# Patient Record
Sex: FEMALE | Race: WHITE | Employment: OTHER | URBAN - METROPOLITAN AREA
[De-identification: names, ages, dates, MRNs, and addresses within clinical notes are randomized per-mention and may not be internally consistent; named-entity substitution may affect disease eponyms.]

---

## 2023-08-31 ENCOUNTER — HOSPITAL ENCOUNTER (EMERGENCY)
Facility: HOSPITAL | Age: 78
Discharge: STILL ADMITTED OR ADMINISTRATIVE | End: 2023-09-01
Payer: MEDICARE

## 2023-08-31 ENCOUNTER — HOSPITAL ENCOUNTER (OUTPATIENT)
Facility: HOSPITAL | Age: 78
Setting detail: OBSERVATION
Discharge: HOME | End: 2023-09-01
Attending: EMERGENCY MEDICINE | Admitting: HOSPITALIST
Payer: MEDICARE

## 2023-08-31 ENCOUNTER — APPOINTMENT (OUTPATIENT)
Dept: GENERAL RADIOLOGY | Age: 78
End: 2023-08-31
Attending: EMERGENCY MEDICINE
Payer: MEDICARE

## 2023-08-31 ENCOUNTER — APPOINTMENT (OUTPATIENT)
Dept: NUCLEAR MEDICINE | Facility: HOSPITAL | Age: 78
End: 2023-08-31
Attending: EMERGENCY MEDICINE
Payer: MEDICARE

## 2023-08-31 VITALS
HEART RATE: 76 BPM | OXYGEN SATURATION: 94 % | BODY MASS INDEX: 28.35 KG/M2 | RESPIRATION RATE: 16 BRPM | WEIGHT: 160 LBS | DIASTOLIC BLOOD PRESSURE: 67 MMHG | SYSTOLIC BLOOD PRESSURE: 158 MMHG | TEMPERATURE: 97 F | HEIGHT: 63 IN

## 2023-08-31 DIAGNOSIS — R79.89 ELEVATED SERUM CREATININE: ICD-10-CM

## 2023-08-31 DIAGNOSIS — R77.8 ELEVATED TROPONIN: ICD-10-CM

## 2023-08-31 DIAGNOSIS — R07.9 ACUTE CHEST PAIN: Primary | ICD-10-CM

## 2023-08-31 LAB
ALBUMIN SERPL-MCNC: 3.5 G/DL (ref 3.4–5)
ALBUMIN/GLOB SERPL: 1 {RATIO} (ref 1–2)
ALP LIVER SERPL-CCNC: 92 U/L
ALT SERPL-CCNC: 24 U/L
ANION GAP SERPL CALC-SCNC: 5 MMOL/L (ref 0–18)
APTT PPP: 27.2 SECONDS (ref 23.3–35.6)
AST SERPL-CCNC: 20 U/L (ref 15–37)
BASOPHILS # BLD AUTO: 0.08 X10(3) UL (ref 0–0.2)
BASOPHILS NFR BLD AUTO: 0.7 %
BILIRUB SERPL-MCNC: 0.4 MG/DL (ref 0.1–2)
BUN BLD-MCNC: 22 MG/DL (ref 7–18)
CALCIUM BLD-MCNC: 8.9 MG/DL (ref 8.5–10.1)
CHLORIDE SERPL-SCNC: 109 MMOL/L (ref 98–112)
CHOLEST SERPL-MCNC: 154 MG/DL (ref ?–200)
CO2 SERPL-SCNC: 24 MMOL/L (ref 21–32)
CREAT BLD-MCNC: 2.15 MG/DL
D DIMER PPP FEU-MCNC: 1.77 UG/ML FEU (ref ?–0.76)
EGFRCR SERPLBLD CKD-EPI 2021: 23 ML/MIN/1.73M2 (ref 60–?)
EOSINOPHIL # BLD AUTO: 0.17 X10(3) UL (ref 0–0.7)
EOSINOPHIL NFR BLD AUTO: 1.6 %
ERYTHROCYTE [DISTWIDTH] IN BLOOD BY AUTOMATED COUNT: 13.9 %
GLOBULIN PLAS-MCNC: 3.5 G/DL (ref 2.8–4.4)
GLUCOSE BLD-MCNC: 124 MG/DL (ref 70–99)
HCT VFR BLD AUTO: 40.5 %
HDLC SERPL-MCNC: 45 MG/DL (ref 40–59)
HGB BLD-MCNC: 13 G/DL
IMM GRANULOCYTES # BLD AUTO: 0.13 X10(3) UL (ref 0–1)
IMM GRANULOCYTES NFR BLD: 1.2 %
LDLC SERPL CALC-MCNC: 82 MG/DL (ref ?–100)
LIPASE SERPL-CCNC: 83 U/L (ref 13–75)
LYMPHOCYTES # BLD AUTO: 1.55 X10(3) UL (ref 1–4)
LYMPHOCYTES NFR BLD AUTO: 14.4 %
MCH RBC QN AUTO: 28.3 PG (ref 26–34)
MCHC RBC AUTO-ENTMCNC: 32.1 G/DL (ref 31–37)
MCV RBC AUTO: 88.2 FL
MONOCYTES # BLD AUTO: 0.61 X10(3) UL (ref 0.1–1)
MONOCYTES NFR BLD AUTO: 5.7 %
NEUTROPHILS # BLD AUTO: 8.23 X10 (3) UL (ref 1.5–7.7)
NEUTROPHILS # BLD AUTO: 8.23 X10(3) UL (ref 1.5–7.7)
NEUTROPHILS NFR BLD AUTO: 76.4 %
NONHDLC SERPL-MCNC: 109 MG/DL (ref ?–130)
NT-PROBNP SERPL-MCNC: 463 PG/ML (ref ?–450)
OSMOLALITY SERPL CALC.SUM OF ELEC: 291 MOSM/KG (ref 275–295)
PLATELET # BLD AUTO: 451 10(3)UL (ref 150–450)
POTASSIUM SERPL-SCNC: 4 MMOL/L (ref 3.5–5.1)
PROT SERPL-MCNC: 7 G/DL (ref 6.4–8.2)
RBC # BLD AUTO: 4.59 X10(6)UL
SODIUM SERPL-SCNC: 138 MMOL/L (ref 136–145)
TRIGL SERPL-MCNC: 158 MG/DL (ref 30–149)
TROPONIN I HIGH SENSITIVITY: 66 NG/L
VLDLC SERPL CALC-MCNC: 25 MG/DL (ref 0–30)
WBC # BLD AUTO: 10.8 X10(3) UL (ref 4–11)

## 2023-08-31 PROCEDURE — 71045 X-RAY EXAM CHEST 1 VIEW: CPT | Performed by: EMERGENCY MEDICINE

## 2023-08-31 PROCEDURE — 78582 LUNG VENTILAT&PERFUS IMAGING: CPT | Performed by: EMERGENCY MEDICINE

## 2023-08-31 PROCEDURE — 99223 1ST HOSP IP/OBS HIGH 75: CPT | Performed by: HOSPITALIST

## 2023-08-31 PROCEDURE — 84484 ASSAY OF TROPONIN QUANT: CPT | Performed by: EMERGENCY MEDICINE

## 2023-08-31 RX ORDER — LISINOPRIL 10 MG/1
10 TABLET ORAL DAILY
COMMUNITY

## 2023-08-31 RX ORDER — ISOSORBIDE MONONITRATE 30 MG/1
30 TABLET, EXTENDED RELEASE ORAL DAILY
COMMUNITY

## 2023-08-31 RX ORDER — SODIUM CHLORIDE 9 MG/ML
INJECTION, SOLUTION INTRAVENOUS CONTINUOUS
Status: DISCONTINUED | OUTPATIENT
Start: 2023-08-31 | End: 2023-09-01

## 2023-08-31 RX ORDER — BISOPROLOL FUMARATE 10 MG/1
10 TABLET, FILM COATED ORAL DAILY
COMMUNITY

## 2023-08-31 RX ORDER — ISOSORBIDE MONONITRATE 30 MG/1
30 TABLET, EXTENDED RELEASE ORAL DAILY
Status: DISCONTINUED | OUTPATIENT
Start: 2023-09-01 | End: 2023-09-01

## 2023-08-31 RX ORDER — ONDANSETRON 2 MG/ML
8 INJECTION INTRAMUSCULAR; INTRAVENOUS EVERY 6 HOURS PRN
Status: DISCONTINUED | OUTPATIENT
Start: 2023-08-31 | End: 2023-09-01

## 2023-08-31 RX ORDER — LOVASTATIN 40 MG/1
40 TABLET ORAL NIGHTLY
COMMUNITY

## 2023-08-31 RX ORDER — BENZONATATE 100 MG/1
200 CAPSULE ORAL 3 TIMES DAILY PRN
Status: DISCONTINUED | OUTPATIENT
Start: 2023-08-31 | End: 2023-09-01

## 2023-08-31 RX ORDER — MELOXICAM 15 MG/1
15 TABLET ORAL DAILY
COMMUNITY

## 2023-08-31 RX ORDER — AMLODIPINE BESYLATE 5 MG/1
5 TABLET ORAL DAILY
Status: DISCONTINUED | OUTPATIENT
Start: 2023-08-31 | End: 2023-09-01

## 2023-08-31 RX ORDER — FLUTICASONE FUROATE, UMECLIDINIUM BROMIDE AND VILANTEROL TRIFENATATE 100; 62.5; 25 UG/1; UG/1; UG/1
1 POWDER RESPIRATORY (INHALATION) DAILY
COMMUNITY

## 2023-08-31 RX ORDER — ALBUTEROL SULFATE 90 UG/1
1 AEROSOL, METERED RESPIRATORY (INHALATION) EVERY 6 HOURS PRN
Status: DISCONTINUED | OUTPATIENT
Start: 2023-08-31 | End: 2023-09-01

## 2023-08-31 RX ORDER — BISACODYL 10 MG
10 SUPPOSITORY, RECTAL RECTAL
Status: DISCONTINUED | OUTPATIENT
Start: 2023-08-31 | End: 2023-09-01

## 2023-08-31 RX ORDER — HEPARIN SODIUM AND DEXTROSE 10000; 5 [USP'U]/100ML; G/100ML
12 INJECTION INTRAVENOUS ONCE
Status: COMPLETED | OUTPATIENT
Start: 2023-08-31 | End: 2023-09-01

## 2023-08-31 RX ORDER — ASPIRIN 81 MG/1
324 TABLET, CHEWABLE ORAL DAILY
Status: DISCONTINUED | OUTPATIENT
Start: 2023-09-01 | End: 2023-09-01

## 2023-08-31 RX ORDER — ACETAMINOPHEN 500 MG
500 TABLET ORAL EVERY 4 HOURS PRN
Status: DISCONTINUED | OUTPATIENT
Start: 2023-08-31 | End: 2023-09-01

## 2023-08-31 RX ORDER — BUDESONIDE 0.5 MG/2ML
0.5 INHALANT ORAL DAILY
COMMUNITY

## 2023-08-31 RX ORDER — METHOCARBAMOL 500 MG/1
500 TABLET, FILM COATED ORAL 4 TIMES DAILY
Status: DISCONTINUED | OUTPATIENT
Start: 2023-08-31 | End: 2023-09-01

## 2023-08-31 RX ORDER — ASPIRIN 81 MG/1
81 TABLET, CHEWABLE ORAL DAILY
COMMUNITY

## 2023-08-31 RX ORDER — BUDESONIDE 0.5 MG/2ML
0.5 INHALANT ORAL DAILY
Status: DISCONTINUED | OUTPATIENT
Start: 2023-08-31 | End: 2023-09-01

## 2023-08-31 RX ORDER — MELATONIN
3 NIGHTLY PRN
Status: DISCONTINUED | OUTPATIENT
Start: 2023-08-31 | End: 2023-09-01

## 2023-08-31 RX ORDER — POLYETHYLENE GLYCOL 3350 17 G/17G
17 POWDER, FOR SOLUTION ORAL DAILY PRN
Status: DISCONTINUED | OUTPATIENT
Start: 2023-08-31 | End: 2023-09-01

## 2023-08-31 RX ORDER — HEPARIN SODIUM AND DEXTROSE 10000; 5 [USP'U]/100ML; G/100ML
INJECTION INTRAVENOUS CONTINUOUS
Status: DISCONTINUED | OUTPATIENT
Start: 2023-09-01 | End: 2023-09-01

## 2023-08-31 RX ORDER — ATORVASTATIN CALCIUM 10 MG/1
10 TABLET, FILM COATED ORAL NIGHTLY
Status: DISCONTINUED | OUTPATIENT
Start: 2023-08-31 | End: 2023-09-01

## 2023-08-31 RX ORDER — ECHINACEA PURPUREA EXTRACT 125 MG
1 TABLET ORAL
Status: DISCONTINUED | OUTPATIENT
Start: 2023-08-31 | End: 2023-09-01

## 2023-08-31 RX ORDER — LISINOPRIL 10 MG/1
10 TABLET ORAL DAILY
Status: DISCONTINUED | OUTPATIENT
Start: 2023-09-01 | End: 2023-09-01

## 2023-08-31 RX ORDER — PANTOPRAZOLE SODIUM 20 MG/1
20 TABLET, DELAYED RELEASE ORAL
Status: DISCONTINUED | OUTPATIENT
Start: 2023-09-01 | End: 2023-09-01

## 2023-08-31 RX ORDER — ASPIRIN 81 MG/1
324 TABLET, CHEWABLE ORAL ONCE
Status: COMPLETED | OUTPATIENT
Start: 2023-08-31 | End: 2023-08-31

## 2023-08-31 RX ORDER — AMLODIPINE BESYLATE 5 MG/1
5 TABLET ORAL DAILY
COMMUNITY

## 2023-08-31 RX ORDER — METHOCARBAMOL 500 MG/1
500 TABLET, FILM COATED ORAL 4 TIMES DAILY
COMMUNITY

## 2023-08-31 RX ORDER — SENNOSIDES 8.6 MG
17.2 TABLET ORAL NIGHTLY PRN
Status: DISCONTINUED | OUTPATIENT
Start: 2023-08-31 | End: 2023-09-01

## 2023-08-31 RX ORDER — ALBUTEROL SULFATE 90 UG/1
AEROSOL, METERED RESPIRATORY (INHALATION) EVERY 6 HOURS PRN
COMMUNITY

## 2023-08-31 RX ORDER — OMEPRAZOLE 20 MG/1
20 CAPSULE, DELAYED RELEASE ORAL
COMMUNITY

## 2023-08-31 RX ORDER — HEPARIN SODIUM 1000 [USP'U]/ML
60 INJECTION, SOLUTION INTRAVENOUS; SUBCUTANEOUS ONCE
Status: COMPLETED | OUTPATIENT
Start: 2023-08-31 | End: 2023-08-31

## 2023-08-31 RX ORDER — METOPROLOL TARTRATE 100 MG/1
100 TABLET ORAL
Status: DISCONTINUED | OUTPATIENT
Start: 2023-09-01 | End: 2023-09-01

## 2023-08-31 NOTE — ED INITIAL ASSESSMENT (HPI)
Started with chest pain 1-1.5 hours ago while just sitting, felt warm, denies sweating, was short of breath, entire jaw felt tight, chest pain was at breast level, lasted one hour, denies pain now, did have nausea

## 2023-09-01 ENCOUNTER — APPOINTMENT (OUTPATIENT)
Dept: CV DIAGNOSTICS | Facility: HOSPITAL | Age: 78
End: 2023-09-01
Attending: HOSPITALIST
Payer: MEDICARE

## 2023-09-01 ENCOUNTER — APPOINTMENT (OUTPATIENT)
Dept: INTERVENTIONAL RADIOLOGY/VASCULAR | Facility: HOSPITAL | Age: 78
End: 2023-09-01
Attending: NURSE PRACTITIONER
Payer: MEDICARE

## 2023-09-01 VITALS
TEMPERATURE: 98 F | SYSTOLIC BLOOD PRESSURE: 135 MMHG | BODY MASS INDEX: 28.35 KG/M2 | WEIGHT: 160 LBS | HEIGHT: 63 IN | RESPIRATION RATE: 21 BRPM | DIASTOLIC BLOOD PRESSURE: 64 MMHG | OXYGEN SATURATION: 93 % | HEART RATE: 61 BPM

## 2023-09-01 LAB
ANION GAP SERPL CALC-SCNC: 3 MMOL/L (ref 0–18)
ANION GAP SERPL CALC-SCNC: 4 MMOL/L (ref 0–18)
APTT PPP: 176.5 SECONDS (ref 23.3–35.6)
APTT PPP: 31.9 SECONDS (ref 23.3–35.6)
ATRIAL RATE: 77 BPM
BUN BLD-MCNC: 21 MG/DL (ref 7–18)
BUN BLD-MCNC: 24 MG/DL (ref 7–18)
CALCIUM BLD-MCNC: 8.8 MG/DL (ref 8.5–10.1)
CALCIUM BLD-MCNC: 9.1 MG/DL (ref 8.5–10.1)
CHLORIDE SERPL-SCNC: 109 MMOL/L (ref 98–112)
CHLORIDE SERPL-SCNC: 113 MMOL/L (ref 98–112)
CO2 SERPL-SCNC: 25 MMOL/L (ref 21–32)
CO2 SERPL-SCNC: 27 MMOL/L (ref 21–32)
CREAT BLD-MCNC: 1.21 MG/DL
CREAT BLD-MCNC: 1.49 MG/DL
EGFRCR SERPLBLD CKD-EPI 2021: 36 ML/MIN/1.73M2 (ref 60–?)
EGFRCR SERPLBLD CKD-EPI 2021: 46 ML/MIN/1.73M2 (ref 60–?)
ERYTHROCYTE [DISTWIDTH] IN BLOOD BY AUTOMATED COUNT: 13.8 %
ERYTHROCYTE [DISTWIDTH] IN BLOOD BY AUTOMATED COUNT: 13.8 %
GLUCOSE BLD-MCNC: 102 MG/DL (ref 70–99)
GLUCOSE BLD-MCNC: 106 MG/DL (ref 70–99)
HCT VFR BLD AUTO: 36.9 %
HCT VFR BLD AUTO: 37.3 %
HGB BLD-MCNC: 12.3 G/DL
HGB BLD-MCNC: 12.4 G/DL
MCH RBC QN AUTO: 28.7 PG (ref 26–34)
MCH RBC QN AUTO: 28.8 PG (ref 26–34)
MCHC RBC AUTO-ENTMCNC: 33.2 G/DL (ref 31–37)
MCHC RBC AUTO-ENTMCNC: 33.3 G/DL (ref 31–37)
MCV RBC AUTO: 86.3 FL
MCV RBC AUTO: 86.4 FL
OSMOLALITY SERPL CALC.SUM OF ELEC: 294 MOSM/KG (ref 275–295)
OSMOLALITY SERPL CALC.SUM OF ELEC: 295 MOSM/KG (ref 275–295)
P AXIS: 71 DEGREES
P-R INTERVAL: 130 MS
PLATELET # BLD AUTO: 380 10(3)UL (ref 150–450)
PLATELET # BLD AUTO: 380 10(3)UL (ref 150–450)
PLATELET # BLD AUTO: 386 10(3)UL (ref 150–450)
POTASSIUM SERPL-SCNC: 3.8 MMOL/L (ref 3.5–5.1)
POTASSIUM SERPL-SCNC: 4.6 MMOL/L (ref 3.5–5.1)
Q-T INTERVAL: 400 MS
QRS DURATION: 78 MS
QTC CALCULATION (BEZET): 452 MS
R AXIS: 30 DEGREES
RBC # BLD AUTO: 4.27 X10(6)UL
RBC # BLD AUTO: 4.32 X10(6)UL
SODIUM SERPL-SCNC: 140 MMOL/L (ref 136–145)
SODIUM SERPL-SCNC: 141 MMOL/L (ref 136–145)
T AXIS: 41 DEGREES
TROPONIN I HIGH SENSITIVITY: 602 NG/L
TROPONIN I HIGH SENSITIVITY: 886 NG/L
VENTRICULAR RATE: 77 BPM
WBC # BLD AUTO: 10.4 X10(3) UL (ref 4–11)
WBC # BLD AUTO: 7.5 X10(3) UL (ref 4–11)

## 2023-09-01 PROCEDURE — 93306 TTE W/DOPPLER COMPLETE: CPT | Performed by: HOSPITALIST

## 2023-09-01 PROCEDURE — B2111ZZ FLUOROSCOPY OF MULTIPLE CORONARY ARTERIES USING LOW OSMOLAR CONTRAST: ICD-10-PCS | Performed by: INTERNAL MEDICINE

## 2023-09-01 PROCEDURE — B2151ZZ FLUOROSCOPY OF LEFT HEART USING LOW OSMOLAR CONTRAST: ICD-10-PCS | Performed by: INTERNAL MEDICINE

## 2023-09-01 PROCEDURE — 99239 HOSP IP/OBS DSCHRG MGMT >30: CPT | Performed by: STUDENT IN AN ORGANIZED HEALTH CARE EDUCATION/TRAINING PROGRAM

## 2023-09-01 PROCEDURE — 4A023N7 MEASUREMENT OF CARDIAC SAMPLING AND PRESSURE, LEFT HEART, PERCUTANEOUS APPROACH: ICD-10-PCS | Performed by: INTERNAL MEDICINE

## 2023-09-01 RX ORDER — ACETAMINOPHEN 325 MG/1
650 TABLET ORAL EVERY 4 HOURS PRN
Status: DISCONTINUED | OUTPATIENT
Start: 2023-09-01 | End: 2023-09-01

## 2023-09-01 RX ORDER — IODIXANOL 320 MG/ML
100 INJECTION, SOLUTION INTRAVASCULAR
Status: COMPLETED | OUTPATIENT
Start: 2023-09-01 | End: 2023-09-01

## 2023-09-01 RX ORDER — SODIUM CHLORIDE 9 MG/ML
INJECTION, SOLUTION INTRAVENOUS
Status: DISCONTINUED | OUTPATIENT
Start: 2023-09-02 | End: 2023-09-01 | Stop reason: HOSPADM

## 2023-09-01 RX ORDER — HEPARIN SODIUM 5000 [USP'U]/ML
INJECTION, SOLUTION INTRAVENOUS; SUBCUTANEOUS
Status: COMPLETED
Start: 2023-09-01 | End: 2023-09-01

## 2023-09-01 RX ORDER — TRAMADOL HYDROCHLORIDE 50 MG/1
100 TABLET ORAL EVERY 6 HOURS PRN
Status: DISCONTINUED | OUTPATIENT
Start: 2023-09-01 | End: 2023-09-01

## 2023-09-01 RX ORDER — TRAMADOL HYDROCHLORIDE 50 MG/1
50 TABLET ORAL EVERY 6 HOURS PRN
Status: DISCONTINUED | OUTPATIENT
Start: 2023-09-01 | End: 2023-09-01

## 2023-09-01 RX ORDER — SODIUM CHLORIDE 9 MG/ML
INJECTION, SOLUTION INTRAVENOUS CONTINUOUS
Status: DISCONTINUED | OUTPATIENT
Start: 2023-09-01 | End: 2023-09-01

## 2023-09-01 RX ORDER — ASPIRIN 81 MG/1
81 TABLET, CHEWABLE ORAL ONCE
Status: COMPLETED | OUTPATIENT
Start: 2023-09-01 | End: 2023-09-01

## 2023-09-01 RX ORDER — MIDAZOLAM HYDROCHLORIDE 1 MG/ML
INJECTION INTRAMUSCULAR; INTRAVENOUS
Status: COMPLETED
Start: 2023-09-01 | End: 2023-09-01

## 2023-09-01 RX ORDER — LIDOCAINE HYDROCHLORIDE 10 MG/ML
INJECTION, SOLUTION EPIDURAL; INFILTRATION; INTRACAUDAL; PERINEURAL
Status: COMPLETED
Start: 2023-09-01 | End: 2023-09-01

## 2023-09-01 RX ORDER — POTASSIUM CHLORIDE 20 MEQ/1
40 TABLET, EXTENDED RELEASE ORAL ONCE
Status: COMPLETED | OUTPATIENT
Start: 2023-09-01 | End: 2023-09-01

## 2023-09-01 NOTE — PLAN OF CARE
Receive patient from ED for CP. Pt denies CP at the moment. Patient is alert & oriented x4. Pt ambulates well, SBA for now overnight. Breath sounds are clear bilateral. On room air, CPAP worn at night. Normal sinus rhythm. Head-to-toe skin assessment done, no signs of wounds, skin dry and intact. Pt is continent of bowel and bladder. Heparin gtt and IVF overnight. Bed in low position and call light within reach. Reviewed plan of care and patient verbalizes understanding. Troponin elevated. Cardiology paged. Repeat troponin in AM. NPO after midnight. Problem: Patient/Family Goals  Goal: Patient/Family Long Term Goal  Description: Patient's Long Term Goal: Back home    Interventions:  - Hep gtt  - Echo  - See additional Care Plan goals for specific interventions  Outcome: Progressing  Goal: Patient/Family Short Term Goal  Description: Patient's Short Term Goal: No CP    Interventions:   - Medication management  - Hep gtt  - See additional Care Plan goals for specific interventions  Outcome: Progressing     Problem: CARDIOVASCULAR - ADULT  Goal: Maintains optimal cardiac output and hemodynamic stability  Description: INTERVENTIONS:  - Monitor vital signs, rhythm, and trends  - Monitor for bleeding, hypotension and signs of decreased cardiac output  - Evaluate effectiveness of vasoactive medications to optimize hemodynamic stability  - Monitor arterial and/or venous puncture sites for bleeding and/or hematoma  - Assess quality of pulses, skin color and temperature  - Assess for signs of decreased coronary artery perfusion - ex.  Angina  - Evaluate fluid balance, assess for edema, trend weights  Outcome: Progressing  Goal: Absence of cardiac arrhythmias or at baseline  Description: INTERVENTIONS:  - Continuous cardiac monitoring, monitor vital signs, obtain 12 lead EKG if indicated  - Evaluate effectiveness of antiarrhythmic and heart rate control medications as ordered  - Initiate emergency measures for life threatening arrhythmias  - Monitor electrolytes and administer replacement therapy as ordered  Outcome: Progressing

## 2023-09-01 NOTE — ED QUICK NOTES
Called ED charge to report that PT is en route.
Orders for admission, patient is aware of plan and ready to go upstairs. Any questions, please call ED RN Amanda at extension [phone]    Patient Covid vaccination status: Unvaccinated     COVID Test Ordered in ED: None    COVID Suspicion at Admission: N/A    Running Infusions:  Heparin    Mental Status/LOC at time of transport: A+O x4, from home, ambulatory.     Other pertinent information:   CIWA score: N/A   NIH score:  N/A
Patient out of department for Nuclear Med study.
Pt arrived to [facility] via [facility] w/o incident. Ambulatory to bathroom on arrival. Placed on continuous monitoring devices Heparin is running as ordered. NSR on monitor. Denies difficutly in breathing and denies chest pain/pressure at this time. Hospitalist at bedside.
Pt to go directly to [facility] ED. [facility] called for transport.   Report called to charge nurse Miguel Simons
Report to [facility]
[Negative] : Endocrine

## 2023-09-01 NOTE — PROCEDURES
Full procedure was dictated. Procedure performed:  -Selective coronary angiography  -LV gram    Indications:  -Chest pain and dyspnea on exertion  -Elevated troponin level 888  -CAD  -History of PCI of circumflex Taxus 2021  -COPD with chronic respiratory insufficiency on oxygen  -Hypertension  -Dyslipidemia    [facility] findings:  -Widely patent circumflex/OM long stent  -No disease in remaining coronary arteries and left coronary arterial circulation  -Diffuse mild disease in RCA    LV gram: LVEF 65% with no wall motion abnormalities  1+/2+ MR  No thoracic aorta aneurysm  No pressure gradient across aortic valve    Hemostasis of the right femoral artery: Manual hold due to very high right femoral bifurcation. No closure device was used. Plan:  -Renal function improved but will continue hydration for another 8 hours  -Discharge plan late afternoon early evening if all good and stable and no right groin issues after catheterization otherwise tomorrow morning -patient has to ambulate with no symptoms  -No need for PCI, medical management  -DC IV heparin drip  -Most likely patient presentation is related to COPD with chronic hypoxic respiratory insufficiency on constant oxygen. She is traveling and over exert herself by sightseeing with family in [facility] -troponin elevation due to mismatch with hypoxia  -Continue continuous oxygen supply and she has it with her traveling from [place] and will plan to go to [place] but should not fly sooner than in 5 days being after cardiac catheterization  -Follow-up with patient's cardiologist in [place] after finishing seeing family for holidays  -Continue aspirin and statin with other cardiac medications as scheduled. No change in medications needed.  -No change in cardiac meds now, presentation related more to COPD and hypoxia  -Continue oxygen and bronchodilators  -Right groin straight for 4 hours with IV fluids 75 cc/h for 8 hours  -Discharge plan tonight or in a.m. depends on ambulation and symptomatology but most likely tonight    Discussed with the patient and the nursing staff  Communicated with the team    Bebeto Mackey MD  [facility] cardiology  September 1, 2023  10:40 AM

## 2023-09-01 NOTE — PROCEDURES
[address]    PATIENT'S NAME: Caroline Gimenez   ATTENDING PHYSICIAN: Long Rodríguez. Rukhsana Greer MD   OPERATING PHYSICIAN: Ashley Collazo M.D. PATIENT ACCOUNT#:   [de-identified]    LOCATION:  [address] [place] [address]  MEDICAL RECORD #:   [id number]       YOB: 1945  ADMISSION DATE:       08/31/2023      OPERATION DATE:  09/01/2023    CARDIAC PROCEDURE TRANSCRIPTION    CARDIAC CATHETERIZATION     PREOPERATIVE DIAGNOSIS:    1. Chest pain. 2.   Dyspnea on exertion. 3.   Elevated troponin level. 4.   Coronary artery disease. 5.   History of percutaneous coronary intervention of left circumflex artery in [place], 2021.  6.   Systemic hypertension. 7.   Hyperlipidemia. 8.   Chronic obstructive pulmonary disease with chronic respiratory insufficiency, on continuous oxygen at home. POSTOPERATIVE DIAGNOSIS:    1. Chest pain. 2.   Dyspnea on exertion. 3.   Elevated troponin level. 4.   Coronary artery disease. 5.   History of percutaneous coronary intervention of left circumflex artery in [place], 2021.  6.   Systemic hypertension. 7.   Hyperlipidemia. 8.   Chronic obstructive pulmonary disease with chronic respiratory insufficiency, on continuous oxygen at home. PROCEDURE PERFORMED:    1. Selective coronary angiography. 2.   Left ventriculogram.    DESCRIPTION OF PROCEDURE:  After written informed consent was obtained from the patient, she was brought to cardiac catheterization laboratory. She was prepped and draped in usual sterile fashion. Lidocaine 1% was used to infiltrate her right groin for local anesthesia and a 6-Libyan introducer sheath was inserted into right femoral artery using modified Seldinger technique. Selective coronary angiography was performed using 6-Libyan FL4 and FR4 diagnostic catheters. Left ventriculogram was performed using 6-Libyan pigtail catheter. We then measured pullback pressures across the aortic valve and pigtail catheter was removed.       Right femoral artery sheathogram was performed, but it was not acceptable for percutaneous closure using closure device since patient has very high femoral bifurcation. Hemostasis was achieved successfully with manual hold for 20 to 25 minutes, and there was no bleeding or hematoma in the right groin. Patient tolerated the procedure very well and was transferred to telemetry for monitoring. There were no immediate postcardiac catheterization complications. HEMODYNAMICS:  Pullback pressures across the aortic valve were as follows:  LV cavity pressure 130/10/14 mmHg. Central aortic pressure was 129/54/85 mmHg with a heart rate in the 70s beats per minute. Patient stayed in sinus rhythm with no prognostically significant arrhythmia. SELECTIVE CORONARY ANGIOGRAPHY FINDINGS:    1. Left main coronary artery had no disease angiographically. 2.   LAD artery and major diagonal branches had no disease angiographically. 3.   Left circumflex artery and major OM long stent was widely patent. There was no disease in remaining segments of circumflex system. 4.   Right coronary artery had diffuse mild disease with sequential 30% to 40% lesions in mid RCA; otherwise, no disease angiographically. Right posterior descending artery and right posterolateral branch with no disease angiographically. Left ventriculogram revealed normal LV systolic function with LVEF of 65% with no significant wall motion abnormalities. There was 1+/2+ MR angiographically and no thoracic aorta aneurysm. IMPRESSION:       1. Widely patent long stent in proximal circumflex/obtuse marginal from [place] in 2021. Otherwise, no coronary artery disease in left coronary arterial system. 2.   Right coronary artery had diffuse mild disease in the mid segment with sequential 30% to 40% lesions. Right posterior descending artery and right posterolateral branch with no significant disease angiographically.     3.   Normal left ventricular systolic function with left ventricular ejection fraction of 65%, with no significant wall motion abnormalities. 4.   Mitral regurgitation of 1+/2+ angiographically. 5.   No thoracic aorta aneurysm. 6.   No pressure gradient across the aortic valve. 7.   Normal left ventricular end-diastolic pressure. 8.   Hemostasis of the right femoral artery with manual hold due to very high right femoral bifurcation. PLAN:    1. Renal function improved with IV hydration. Patient came with mild hypovolemia. Will continue hydration for another 8 hours. 2.   Discharge planned late afternoon/early evening if all good and stable and no right groin issues after catheterization, otherwise tomorrow morning, but I would like to rule out PE first since patient is traveling. 3.   Check D-dimer and if positive, would have to rule out PE since patient is traveling from [place] and is planning to go to [place]. 4.   No need for PCI, medical management. 5.   Most likely patient's presentation is related to COPD with chronic hypoxic respiratory insufficiency, on constant oxygen at home. She is traveling and overexerted herself by sightseeing with family in [facility]. Troponin elevation most likely due to mismatch with hypoxia rather than acute coronary event. 6.   Continue continuous oxygen supply. She has it with her while traveling from [place], with plan to go to [place], but should not fly sooner than 5 days after cardiac catheterization. 7.   Follow up with patient's cardiologist in [place] after finished seeing family for holiday. 8.   Continue aspirin and statin with other cardiac medications as scheduled. 9.   No change in cardiac medications now. Presentation related more to COPD and hypoxia. 10.   Continue oxygen and bronchodilators. 11.   Right leg straight for 4 hours and IV fluids for 8 hours to finish hydration.     All was discussed with the patient and the nursing staff in detail, and we communicated with the team.    Thank you for allowing me to participate in this patient's care. Should you have any questions, feel free to contact me. Dictated By Erika Adair M.D.  d: 09/01/2023 11:12:47  t: 09/01/2023 12:27:37  Reny Jones [id number]  /    cc: Nomi Nath MD

## 2023-09-01 NOTE — PROGRESS NOTES
Notified of troponin results by primary RN. Troponin 66-->886. Patient currently on heparin protocol for ACS. Per RN, pt with no c/o chest pain at this time. Will continue to trend troponin.

## 2023-09-01 NOTE — ED INITIAL ASSESSMENT (HPI)
Sent from PED for VQ scan. Reported chest pressure with radiation to jaw. Presents with Heparin infusing at 900u/hr.

## 2023-09-01 NOTE — PROGRESS NOTES
08/31/23 2256 08/31/23 2300 08/31/23 2302   Vital Signs   Pulse 63 69 75   Resp 16 16 13   BP (!) 163/70 155/59 142/63   MAP (mmHg) 98 89 87   BP Location Right arm Right arm Right arm   BP Method Automatic Automatic Automatic   Patient Position Lying Sitting Standing     Pt denied lightheadedness or dizziness when sitting and standing.

## 2023-09-01 NOTE — PLAN OF CARE
Patient is alert and orientated x4. Lung sounds are clear on room air. Sinus rhythm on tele, no complain of chest pain. Abdomen is soft, non-tendered, bowel sounds are active in all four quadrants. Bed in lower position, call light within reach. 1106: Patient came back from lab, Vital signs per protocol, see flowsheet for details, incision site is soft, no bleeding or hematoma, dressing in place, clean, dry, and intact.      Problem: Patient/Family Goals  Goal: Patient/Family Long Term Goal  Description: Patient's Long Term Goal: Back home    Interventions:  - Hep gtt  - Echo  - See additional Care Plan goals for specific interventions  Outcome: Progressing  Goal: Patient/Family Short Term Goal  Description: Patient's Short Term Goal: No CP    Interventions:   - Medication management  - Hep gtt  - See additional Care Plan goals for specific interventions  Outcome: Progressing

## 2023-09-01 NOTE — ED PROVIDER NOTES
Pleasant 71-year-old female who has been history from [place] presented to the [facility] emergency department with chest pain. History of CAD with stent 2 years ago. Slightly elevated troponin there as well as elevated creatinine, elevated D-dimer. Sent to the ER for VQ scan and plan for admission for serial troponins, cardiology evaluation. Heparin infusing. V/Q ordered. Patient comfortable here, no distress.

## 2023-09-05 ENCOUNTER — PATIENT OUTREACH (OUTPATIENT)
Dept: CASE MANAGEMENT | Age: 78
End: 2023-09-05

## 2023-09-08 ENCOUNTER — PATIENT OUTREACH (OUTPATIENT)
Dept: CASE MANAGEMENT | Age: 78
End: 2023-09-08

## 2023-09-08 NOTE — PROGRESS NOTES
Hospital follow up, first attempt. (Dc 9/1)    Darreld Ayan Lin 68, [address]    No answer, left a voicemail with callback information.

## 2023-09-11 NOTE — PROGRESS NOTES
Patient left a voice message stating she is returning to [place] and will follow up with doctors once home. Closing encounter.

## (undated) NOTE — LETTER
[facility] [address], [address] [place] [address]  Consent for Procedure/Sedation  Date: 9/1/2023         Time: 0900    I hereby authorize Dr. Solitario Rodriguez, my physician and his/her assistants (if applicable), which may include medical students, residents, and/or fellows, to perform the following surgical operation/ procedure and administer such anesthesia as may be determined necessary by my physician:  Operation/Procedure name (s)  Cardiac Catheterization, Left Ventricular Cineangiography, Bilateral Selective Coronary Angiography and/or Right Heart Catheterization; possible Percutaneous Transluminal Coronary Angioplasty, Coronary Atherectomy, Coronary Stent, Intracoronary Thrombolytic therapy, Antiplatelet therapy and/or Intravascular Ultrasound on Lali Gaona   2. I recognize that during the surgical operation/procedure, unforeseen conditions may necessitate additional or different procedures than those listed above. I, therefore, further authorize and request that the above-named surgeon, assistants, or designees perform such procedures as are, in their judgment, necessary and desirable. 3.   My surgeon/physician has discussed prior to my surgery the potential benefits, risks and side effects of this procedure; the likelihood of achieving goals; and potential problems that might occur during recuperation. They also discussed reasonable alternatives to the procedure, including risks, benefits, and side effects related to the alternatives and risks related to not receiving this procedure. I have had all my questions answered and I acknowledge that no guarantee has been made as to the result that may be obtained. 4.   Should the need arise during my operation/procedure, which includes change of level of care prior to discharge, I also consent to the administration of blood and/or blood products.   Further, I understand that despite careful testing and screening of blood or blood products by collecting agencies, I may still be subject to ill effects as a result of receiving a blood transfusion and/or blood products. The following are some, but not all, of the potential risks that can occur: fever and allergic reactions, hemolytic reactions, transmission of diseases such as Hepatitis, AIDS and Cytomegalovirus (CMV) and fluid overload. In the event that I wish to have an autologous transfusion of my own blood, or a directed donor transfusion, I will discuss this with my physician. Check only if Refusing Blood or Blood Products  I understand refusal of blood or blood products as deemed necessary by my physician may have serious consequences to my condition to include possible death. I hereby assume responsibility for my refusal and release the hospital, its personnel, and my physicians from any responsibility for the consequences of my refusal.          o  Refuse      5. I authorize the use of any specimen, organs, tissues, body parts or foreign objects that may be removed from my body during the operation/procedure for diagnosis, research or teaching purposes and their subsequent disposal by hospital authorities. I also authorize the release of specimen test results and/or written reports to my treating physician on the hospital medical staff or other referring or consulting physicians involved in my care, at the discretion of the Pathologist or my treating physician. 6.   I consent to the photographing or videotaping of the operations or procedures to be performed, including appropriate portions of my body for medical, scientific, or educational purposes, provided my identity is not revealed by the pictures or by descriptive texts accompanying them. If the procedure has been photographed/videotaped, the surgeon will obtain the original picture, image, videotape or CD.   The hospital will not be responsible for storage, release or maintenance of the picture, image, tape or CD.    7.   I consent to the presence of a  or observers in the operating room as deemed necessary by my physician or their designees. 8.   I recognize that in the event my procedure results in extended X-Ray/fluoroscopy time, I may develop a skin reaction. 9. If I have a Do Not Attempt Resuscitation (DNAR) order in place, that status will be suspended while in the operating room, procedural suite, and during the recovery period unless otherwise explicitly stated by me (or a person authorized to consent on my behalf). The surgeon or my attending physician will determine when the applicable recovery period ends for purposes of reinstating the DNAR order. 10. Patients having a sterilization procedure: I understand that if the procedure is successful the results will be permanent and it will therefore be impossible for me to inseminate, conceive, or bear children. I also understand that the procedure is intended to result in sterility, although the result has not been guaranteed. 11. I acknowledge that my physician has explained sedation/analgesia administration to me including the risk and benefits I consent to the administration of sedation/analgesia as may be necessary or desirable in the judgment of my physician.     I CERTIFY THAT I HAVE READ AND FULLY UNDERSTAND THE ABOVE CONSENT TO OPERATION and/or OTHER PROCEDURE.        ____________________________________       _________________________________      ______________________________  Signature of Patient         Signature of Responsible Person        Printed Name of Responsible Person    ____________________________________      _________________________________      ______________________________       Signature of Witness          Relationship to Patient                       Date                                       Time  Patient Name: Mary Bustamante     : 1945                 Printed: 2023      Medical Record #: [id number] Page 1 of 2